# Patient Record
Sex: FEMALE | Race: WHITE
[De-identification: names, ages, dates, MRNs, and addresses within clinical notes are randomized per-mention and may not be internally consistent; named-entity substitution may affect disease eponyms.]

---

## 2019-11-15 ENCOUNTER — TRANSCRIPTION ENCOUNTER (OUTPATIENT)
Age: 64
End: 2019-11-15

## 2019-12-02 ENCOUNTER — TRANSCRIPTION ENCOUNTER (OUTPATIENT)
Age: 64
End: 2019-12-02

## 2019-12-12 ENCOUNTER — TRANSCRIPTION ENCOUNTER (OUTPATIENT)
Age: 64
End: 2019-12-12

## 2022-04-27 PROBLEM — Z00.00 ENCOUNTER FOR PREVENTIVE HEALTH EXAMINATION: Status: ACTIVE | Noted: 2022-04-27

## 2022-05-18 ENCOUNTER — APPOINTMENT (OUTPATIENT)
Dept: ORTHOPEDIC SURGERY | Facility: CLINIC | Age: 67
End: 2022-05-18
Payer: OTHER MISCELLANEOUS

## 2022-05-18 DIAGNOSIS — Z78.9 OTHER SPECIFIED HEALTH STATUS: ICD-10-CM

## 2022-05-18 PROCEDURE — 99072 ADDL SUPL MATRL&STAF TM PHE: CPT

## 2022-05-18 PROCEDURE — 99214 OFFICE O/P EST MOD 30 MIN: CPT

## 2022-05-18 NOTE — HISTORY OF PRESENT ILLNESS
[de-identified] : History of Present Illness\par The patient is a 65 year old R hand dominant female who presents for FUV right knee. Continued diffuse right knee pain\par despite PT and NSAIDS. Swelling comes and goes. Minimal temporary relief from cortisone and gel injection. \par Date of Injury/Onset: 3/9/2009\par Pain: At Rest: 1/10 \par With Activity: 2/10 \par Mechanism of injury: 2009- hit in the knee by a chair and then suffered a fall \par This is a Work Related Injury being treated under Worker's Compensation.\par This is not an athletic injury occurring associated with an interscholastic or organized sports team.\par Quality of symptoms: shooting pain, mild swelling\par Improves with: rest\par Worse with: weight bearing , driving , standing \par Treatment/Imaging/Studies Since Last Visit: PT\par Reports Available For Review Today: none\par Out of work/sport: 9/15/21\par School/Sport/Position/Occupation: Special Education \par Change since last visit: none\par Additional Information: no relief from PT\par History

## 2022-05-18 NOTE — PHYSICAL EXAM
[de-identified] : Constitutional: The general appearance of the patient is well developed, well nourished, no deformities and well groomed. Normal\par \par Gait: Gait and function is as follows: normal gait.\par \par Skin: Head and neck visualized skin is normal. Left upper extremity visualized skin is normal. Right upper extremity visualized skin is normal.\par \par Cardiovascular: good capillary refill in the digits of the bilateral lower extremities and no temperature or color changes in the bilateral lower extremities.\par \par Lymphatic: Normal Palpation of lymph nodes in the inguinal.\par \par Neurologic: heel rub from knee to ankle intact in the bilateral lower extremities. Deep Tendon Reflexes in Upper and Lower Extremities The reflexes are present and symmetrical in the bilateral lower extremities.. The patient is oriented to time, place and person. Sensation to light touch intact in the bilateral lower extremities. Mood and Affect is normal\par \par \par Right Knee: moderate effusion.\par Palpation of the knee is as follows: medial joint line tenderness\par Range of Motion is as follows: Extension: 0 degrees. Flexion: 135 degrees.\par \par 9-20-21: X-Ray Examination of the KNEE 4 or more views Right AP, lateral, skyline and AP both knees standing view patellar mal tracking, mild medial OA\par \par General:\par \par The percentage of impairment is total. Board authorized health care provider. I provided the services listed above.

## 2022-05-18 NOTE — ASSESSMENT
[FreeTextEntry1] : Assessment\par 10-12-21 MRI [facility] Rt Knee\par Shallow radial tear at the free edge of the lateral meniscal body.\par Mild osteoarthritic changes in the medial and patellofemoral compartments.\par Cartilage loss is most pronounced at the medial patellar facet adjacent to a thickened medial parapatellar plica.\par Mild insertional semimembranosus tendinosis and moderate semimembranosus bursitis.

## 2022-06-06 ENCOUNTER — APPOINTMENT (OUTPATIENT)
Dept: PHYSICAL MEDICINE AND REHAB | Facility: CLINIC | Age: 67
End: 2022-06-06
Payer: OTHER MISCELLANEOUS

## 2022-06-06 DIAGNOSIS — Z01.818 ENCOUNTER FOR OTHER PREPROCEDURAL EXAMINATION: ICD-10-CM

## 2022-06-06 DIAGNOSIS — R01.1 CARDIAC MURMUR, UNSPECIFIED: ICD-10-CM

## 2022-06-06 DIAGNOSIS — Z87.891 PERSONAL HISTORY OF NICOTINE DEPENDENCE: ICD-10-CM

## 2022-06-06 PROCEDURE — 36410 VNPNXR 3YR/> PHY/QHP DX/THER: CPT

## 2022-06-06 PROCEDURE — 99244 OFF/OP CNSLTJ NEW/EST MOD 40: CPT | Mod: NP,25

## 2022-06-06 PROCEDURE — 99072 ADDL SUPL MATRL&STAF TM PHE: CPT

## 2022-06-06 PROCEDURE — 93000 ELECTROCARDIOGRAM COMPLETE: CPT

## 2022-06-07 LAB
ANION GAP SERPL CALC-SCNC: 13 MMOL/L
BASOPHILS # BLD AUTO: 0.02 K/UL
BASOPHILS NFR BLD AUTO: 0.4 %
BUN SERPL-MCNC: 17 MG/DL
CALCIUM SERPL-MCNC: 9.5 MG/DL
CHLORIDE SERPL-SCNC: 103 MMOL/L
CO2 SERPL-SCNC: 27 MMOL/L
CREAT SERPL-MCNC: 0.82 MG/DL
EGFR: 80 ML/MIN/1.73M2
EOSINOPHIL # BLD AUTO: 0.06 K/UL
EOSINOPHIL NFR BLD AUTO: 1.1 %
GLUCOSE SERPL-MCNC: 92 MG/DL
HCT VFR BLD CALC: 40.5 %
HGB BLD-MCNC: 12.6 G/DL
IMM GRANULOCYTES NFR BLD AUTO: 0.2 %
LYMPHOCYTES # BLD AUTO: 1.44 K/UL
LYMPHOCYTES NFR BLD AUTO: 25.4 %
MAN DIFF?: NORMAL
MCHC RBC-ENTMCNC: 28 PG
MCHC RBC-ENTMCNC: 31.1 GM/DL
MCV RBC AUTO: 90 FL
MONOCYTES # BLD AUTO: 0.58 K/UL
MONOCYTES NFR BLD AUTO: 10.2 %
NEUTROPHILS # BLD AUTO: 3.55 K/UL
NEUTROPHILS NFR BLD AUTO: 62.7 %
PLATELET # BLD AUTO: 243 K/UL
POTASSIUM SERPL-SCNC: 4 MMOL/L
RBC # BLD: 4.5 M/UL
RBC # FLD: 13.6 %
SODIUM SERPL-SCNC: 142 MMOL/L
WBC # FLD AUTO: 5.66 K/UL

## 2022-06-22 NOTE — HISTORY OF PRESENT ILLNESS
[FreeTextEntry1] : Medical clearance [de-identified] : Patient presents today for medical clearance requested by Sammi Carmichael prior to having right knee surgery.  States she has been doing well aside from her knee.  Denies any CP, SOB or diff breathing.  No recent fever chills, cough or cold type symptoms.  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  Has no other complaints at this time.

## 2022-06-22 NOTE — PHYSICAL EXAM
[No Acute Distress] : no acute distress [Well Nourished] : well nourished [Well Developed] : well developed [Well-Appearing] : well-appearing [Normal Sclera/Conjunctiva] : normal sclera/conjunctiva [PERRL] : pupils equal round and reactive to light [EOMI] : extraocular movements intact [Normal Outer Ear/Nose] : the outer ears and nose were normal in appearance [Normal Oropharynx] : the oropharynx was normal [No JVD] : no jugular venous distention [No Lymphadenopathy] : no lymphadenopathy [Supple] : supple [Thyroid Normal, No Nodules] : the thyroid was normal and there were no nodules present [No Respiratory Distress] : no respiratory distress  [No Accessory Muscle Use] : no accessory muscle use [Clear to Auscultation] : lungs were clear to auscultation bilaterally [Normal Rate] : normal rate  [Regular Rhythm] : with a regular rhythm [Normal S1, S2] : normal S1 and S2 [No Carotid Bruits] : no carotid bruits [No Abdominal Bruit] : a ~M bruit was not heard ~T in the abdomen [No Varicosities] : no varicosities [Pedal Pulses Present] : the pedal pulses are present [No Edema] : there was no peripheral edema [No Palpable Aorta] : no palpable aorta [No Extremity Clubbing/Cyanosis] : no extremity clubbing/cyanosis [Soft] : abdomen soft [Non Tender] : non-tender [Non-distended] : non-distended [No Masses] : no abdominal mass palpated [No HSM] : no HSM [Normal Bowel Sounds] : normal bowel sounds [Normal Posterior Cervical Nodes] : no posterior cervical lymphadenopathy [Normal Anterior Cervical Nodes] : no anterior cervical lymphadenopathy [No CVA Tenderness] : no CVA  tenderness [No Spinal Tenderness] : no spinal tenderness [No Joint Swelling] : no joint swelling [Grossly Normal Strength/Tone] : grossly normal strength/tone [No Rash] : no rash [Coordination Grossly Intact] : coordination grossly intact [No Focal Deficits] : no focal deficits [Normal Gait] : normal gait [Deep Tendon Reflexes (DTR)] : deep tendon reflexes were 2+ and symmetric [Normal Affect] : the affect was normal [Normal Insight/Judgement] : insight and judgment were intact [de-identified] : right knee pain

## 2022-06-22 NOTE — PLAN
[FreeTextEntry1] : EKG - NSR - 72, ELIA - 0.156, No acute T wave changes noted.. \par \par labs reviewed.\par \par Labs Drawn by Dr. Mello Wagoner due to poor venous access.  Patient required lab testing due to conditions in her past medical history requiring periodic monitoring.  Labs were sent to [facility].\par \par \par Patient to stop all vitamins at this time as discussed.\par Increase fluids\par \par Patient is medically optimized at this time and may proceed with proposed procedure.

## 2022-06-23 ENCOUNTER — APPOINTMENT (OUTPATIENT)
Dept: ORTHOPEDIC SURGERY | Facility: AMBULATORY SURGERY CENTER | Age: 67
End: 2022-06-23

## 2022-06-23 PROCEDURE — 29881 ARTHRS KNE SRG MNISECTMY M/L: CPT | Mod: RT

## 2022-06-23 PROCEDURE — 29876 ARTHRS KNEE SURG SYNVCT MAJ: CPT | Mod: 59,RT

## 2022-06-23 PROCEDURE — 29876 ARTHRS KNEE SURG SYNVCT MAJ: CPT | Mod: AS,59,RT

## 2022-06-23 PROCEDURE — 29881 ARTHRS KNE SRG MNISECTMY M/L: CPT | Mod: AS,RT

## 2022-07-06 ENCOUNTER — APPOINTMENT (OUTPATIENT)
Dept: ORTHOPEDIC SURGERY | Facility: CLINIC | Age: 67
End: 2022-07-06

## 2022-07-06 VITALS — BODY MASS INDEX: 20.38 KG/M2 | WEIGHT: 115 LBS | HEIGHT: 63 IN

## 2022-07-06 PROCEDURE — 99024 POSTOP FOLLOW-UP VISIT: CPT

## 2022-07-06 NOTE — DISCUSSION/SUMMARY
[de-identified] : Renewed physical therapy, modify activity\par sutures removed, steri strips applied\par No driving for another 10 days at this time\par F/u in 4 weeks \par ------------------------------------------------------------------------\par KASSIDY HOUSTON acting as a scribe for Dr. Arora\par I, Kassidy Houston, attest that this documentation as been prepared under the direction and in the presence of Provider Chucho Arora MD. \par \par # Home Exercise\par The Patient is instructed on a home exercise program.\par \par # Activity Modification \par The patient was advised to modify their activities. \par \par # DX / Natural History\par The patient was advised of the diagnosis. The natural history of the pathology was explained in full to the patient in layman's terms. Several different treatent options were discussed and explained in full to the patient including the risks and benefits of both surgical and non-surgical treatments. All questions and concerns were answered. \par \par # Pain Guide Activities\par The patient was advised to let pain guide the gradual advancement of activities.\par \par \par

## 2022-07-06 NOTE — HISTORY OF PRESENT ILLNESS
[de-identified] : The patient is s/p _Right knee lateral meniscus tear, synovitis, medical plica, and chondromalacia of trochea and patella\par Date of Surgery: 6/23/22\par Pain: At Rest: 1/10 \par With Activity: 2/10 \par Mechanism of injury: 2009- hit in the knee by a chair and then suffered a fall \par This is a Work Related Injury being treated under Worker's Compensation.\par This is not an athletic injury occurring associated with an interscholastic or organized sports team.\par Quality of symptoms: shooting pain, mild swelling\par Improves with: rest\par Worse with: weight bearing , driving , standing \par Treatment/Imaging/Studies Since Last Visit: SX & PT \par Reports Available For Review Today: none\par Out of work/sport: 9/15/21\par School/Sport/Position/Occupation: Special Education \par Change since last visit: none

## 2022-07-06 NOTE — PHYSICAL EXAM
[de-identified] : Neurologic: normal condition, normal DTR UE/LE, normal sensation, normal mood and affect, oriented and able to communicate.\par Skin: normal skin, no rash, no ulcers and no lesions.\par Lymphatic: no obvious lymphadenopathy in areas examined.\par Constitutional: well developed and well nourished.\par Cardiovascular: peripheral vascular exam is grossly normal.\par Pulmonary: no respiratory distress, lungs clear to auscultation bilaterally. \par Abdomen: normal bowel sounds, non-tender, no HSM and no mass.\par \par RT Knee\par No effusion, clean and dry incisions, intact skin, no fluctuance, no sign of infection, no wound erythema, no induration, no drainage, no purulent damage, no sero-sanguinous drainage, sutures removed, steri strips applied\par

## 2022-08-03 ENCOUNTER — APPOINTMENT (OUTPATIENT)
Dept: ORTHOPEDIC SURGERY | Facility: CLINIC | Age: 67
End: 2022-08-03

## 2022-08-03 VITALS — HEIGHT: 63 IN | WEIGHT: 115 LBS | BODY MASS INDEX: 20.38 KG/M2

## 2022-08-03 PROCEDURE — 99024 POSTOP FOLLOW-UP VISIT: CPT

## 2022-08-03 NOTE — PHYSICAL EXAM
[de-identified] : Neurologic: normal condition, normal DTR UE/LE, normal sensation, normal mood and affect, oriented and able to communicate.\par Skin: normal skin, no rash, no ulcers and no lesions.\par Lymphatic: no obvious lymphadenopathy in areas examined.\par Constitutional: well developed and well nourished.\par Cardiovascular: peripheral vascular exam is grossly normal.\par Pulmonary: no respiratory distress, lungs clear to auscultation bilaterally. \par Abdomen: normal bowel sounds, non-tender, no HSM and no mass.\par \par RT Knee\par Clean and dry incisions, intact skin, no fluctuance, no sign of infection, no wound erythema, no induration, no drainage, no purulent damage, no sero-sanguinous drainage\par Medial tenderness\par Mild swelling \par

## 2022-08-03 NOTE — HISTORY OF PRESENT ILLNESS
[de-identified] : The patient is 6 weeks s/p Right knee lateral meniscus tear, synovitis, medical plica, and chondromalacia of trochlea and patella\par Date of Surgery: 6/23/22\par Pain: At Rest: 1/10 \par With Activity: 2/10 \par Mechanism of injury: 2009- hit in the knee by a chair and then suffered a fall \par This is a Work Related Injury being treated under Worker's Compensation.\par This is not an athletic injury occurring associated with an interscholastic or organized sports team.\par Quality of symptoms: shooting pain, mild swelling\par Improves with: rest\par Worse with: weight bearing , driving , standing \par Treatment/Imaging/Studies Since Last Visit: SX & PT \par Reports Available For Review Today: none\par Out of work/sport: 9/15/21\par School/Sport/Position/Occupation: Special Education \par Change since last visit: none \par

## 2022-08-03 NOTE — DISCUSSION/SUMMARY
[de-identified] : Referred to rheumatologist (Dr. Roth) for Lyme's, rheumatoid, or other autoimmune disorder testing \par Continue physical therapy\par NO work note provided \par Follow up 6 weeks \par \par **Consider ASP & Toradol INJ if not improved. \par -----------------------------------------------\par Home Exercise\par The patient is instructed on a home exercise program.\par \par AYALA GALLEGOS Acting as a Scribe for Dr. Chapa\par I, Ayala Gallegos, attest that this documentation has been prepared under the direction and in the presence of Provider Chucho Chapa MD.\par \par Activity Modification\par The patient was advised to modify their activities.\par \par Dx / Natural History\par The patient was advised of the diagnosis.  The natural history of the pathology was explained in full to the patient in layman's terms.  Several different treatment options were discussed and explained in full to the patient including the risks and benefits of both surgical and non-surgical treatments.  All questions and concerns were answered.\par \par Pain Guide Activities\par The patient was advised to let pain guide the gradual advancement of activities.\par \par RICE\par I explained to the patient that rest, ice, compression, and elevation would benefit them.  They may return to activity after follow-up or when they no longer have any pain.

## 2022-09-14 ENCOUNTER — APPOINTMENT (OUTPATIENT)
Dept: ORTHOPEDIC SURGERY | Facility: CLINIC | Age: 67
End: 2022-09-14

## 2022-09-14 VITALS — WEIGHT: 115 LBS | HEIGHT: 63 IN | BODY MASS INDEX: 20.38 KG/M2

## 2022-09-14 PROCEDURE — 99024 POSTOP FOLLOW-UP VISIT: CPT

## 2022-09-14 PROCEDURE — 20611 DRAIN/INJ JOINT/BURSA W/US: CPT

## 2022-09-14 NOTE — DISCUSSION/SUMMARY
[de-identified] : Patient saw rheumatologist - she stated that she believes the rheumatologist thinks she may have psoriatic arthritis. She was prescribed otesla but has not begun taking it. \par \par RB&A to corticosteroid injection discussed.\par All questions were answered.\par Patient wishes to move forward with injection today. \par \par Aspirated 15ml of clear synovial fluid using ultrasound for proper placement and administered Kenalog/Toradol injection to right knee. \par \par Cortisone AND Toradol Knee Injection - Right\par The risks, benefits, and alternatives to cortisone injection were explained in full to the patient.  Risks outlined include but are not limited to infection, sepsis, bleeding, scarring, skin discoloration, temporary increase in pain, syncopal episode, failure to resolve symptoms, allergic reaction, symptom recurrence, and elevation of blood sugar in diabetics.  Patient understood the risks.  All questions were answered.  After discussion of options, patient requested an injection.  Oral informed consent was obtained and sterile prep was done of the injection site.  A mixture of 40mg of Kenalog, 2cc toradol, 2cc of 1% Lidocaine was sterilely prepared by me.  Sterile technique was used to introduce the mixture into the right knee. Ultrasound was used for proper needle placement.  Patient tolerated the procedure well.  Advised to ice the injection site this evening. \par \par Patient will follow up in 6 weeks \par -----------------------------------------------\par Home Exercise\par The patient is instructed on a home exercise program.\par \par AYALA GALLEGOS Acting as a Scribe for Dr. Chapa\par I, Ayala Gallegos, attest that this documentation has been prepared under the direction and in the presence of Provider Chucho Chapa MD.\par \par Activity Modification\par The patient was advised to modify their activities.\par \par Dx / Natural History\par The patient was advised of the diagnosis.  The natural history of the pathology was explained in full to the patient in layman's terms.  Several different treatment options were discussed and explained in full to the patient including the risks and benefits of both surgical and non-surgical treatments.  All questions and concerns were answered.\par \par Pain Guide Activities\par The patient was advised to let pain guide the gradual advancement of activities.\par \par RICE\par I explained to the patient that rest, ice, compression, and elevation would benefit them.  They may return to activity after follow-up or when they no longer have any pain.

## 2022-09-14 NOTE — PHYSICAL EXAM
[de-identified] : Neurologic: normal condition, normal DTR UE/LE, normal sensation, normal mood and affect, oriented and able to communicate.\par Skin: normal skin, no rash, no ulcers and no lesions.\par Lymphatic: no obvious lymphadenopathy in areas examined.\par Constitutional: well developed and well nourished.\par Cardiovascular: peripheral vascular exam is grossly normal.\par Pulmonary: no respiratory distress, lungs clear to auscultation bilaterally. \par Abdomen: normal bowel sounds, non-tender, no HSM and no mass.\par \par RT Knee\par Clean and dry incisions, intact skin, no fluctuance, no sign of infection, no wound erythema, no induration, no drainage, no purulent damage, no sero-sanguinous drainage\par Medial tenderness\par Mild effusion \par Mild swelling \par

## 2022-09-14 NOTE — HISTORY OF PRESENT ILLNESS
[de-identified] : The patient is 3 months  s/p Right knee lateral meniscus tear, synovitis, medical plica, and chondromalacia of trochlea and patella\par Date of Surgery: 6/23/202\par Pain: At Rest: 1/10 \par With Activity: 2/10 \par Mechanism of injury: 2009- hit in the knee by a chair and then suffered a fall \par This is a Work Related Injury being treated under Worker's Compensation.\par This is not an athletic injury occurring associated with an interscholastic or organized sports team.\par Quality of symptoms: shooting pain, mild swelling\par Improves with: rest\par Worse with: weight bearing , driving , standing \par Treatment/Imaging/Studies Since Last Visit: SX & PT \par Reports Available For Review Today: none\par Out of work/sport: 9/15/21\par School/Sport/Position/Occupation: Special Education \par Change since last visit: none

## 2022-11-02 ENCOUNTER — APPOINTMENT (OUTPATIENT)
Dept: ORTHOPEDIC SURGERY | Facility: CLINIC | Age: 67
End: 2022-11-02

## 2022-11-02 PROCEDURE — 99214 OFFICE O/P EST MOD 30 MIN: CPT

## 2022-11-02 PROCEDURE — 99072 ADDL SUPL MATRL&STAF TM PHE: CPT

## 2022-11-02 NOTE — HISTORY OF PRESENT ILLNESS
[de-identified] : The patient is 5 months s/p Right knee lateral meniscus tear, synovitis, medical plica, and chondromalacia of trochlea and patella\par Date of Surgery: 6/23/202\par Pain: At Rest: 2/10 \par With Activity: 4-5/10 \par Mechanism of injury: 2009- hit in the knee by a chair and then suffered a fall \par This is a Work Related Injury being treated under Worker's Compensation.\par This is not an athletic injury occurring associated with an interscholastic or organized sports team.\par Quality of symptoms: shooting pain, mild swelling\par Improves with: rest\par Worse with: weight bearing , driving , standing \par Treatment/Imaging/Studies Since Last Visit: Physical therapy \par Reports Available For Review Today: none\par Out of work/sport: 9/15/21\par School/Sport/Position/Occupation: Special Education \par Change since last visit: none

## 2022-11-02 NOTE — DISCUSSION/SUMMARY
[de-identified] : Continue physical therapy, Rx renewed\par Patient will follow up in 4 weeks \par -----------------------------------------------\par Home Exercise\par The patient is instructed on a home exercise program.\par \par AYALA GALLEGOS Acting as a Scribe for Dr. Chapa\par I, Ayala Gallegos, attest that this documentation has been prepared under the direction and in the presence of Provider Chucho Chapa MD.\par \par Activity Modification\par The patient was advised to modify their activities.\par \par Dx / Natural History\par The patient was advised of the diagnosis.  The natural history of the pathology was explained in full to the patient in layman's terms.  Several different treatment options were discussed and explained in full to the patient including the risks and benefits of both surgical and non-surgical treatments.  All questions and concerns were answered.\par \par Pain Guide Activities\par The patient was advised to let pain guide the gradual advancement of activities.\par \par RICE\par I explained to the patient that rest, ice, compression, and elevation would benefit them.  They may return to activity after follow-up or when they no longer have any pain.

## 2022-11-02 NOTE — PHYSICAL EXAM
[de-identified] : Neurologic: normal condition, normal DTR UE/LE, normal sensation, normal mood and affect, oriented and able to communicate.\par Skin: normal skin, no rash, no ulcers and no lesions.\par Lymphatic: no obvious lymphadenopathy in areas examined.\par Constitutional: well developed and well nourished.\par Cardiovascular: peripheral vascular exam is grossly normal.\par Pulmonary: no respiratory distress, lungs clear to auscultation bilaterally. \par Abdomen: normal bowel sounds, non-tender, no HSM and no mass.\par \par Right Knee:\par Clean and dry incisions, intact skin, no fluctuance, no sign of infection, no wound erythema, no induration, no drainage, no purulent damage, no sero-sanguinous drainage\par No effusion\par Patellar crepitus\par \par The incident described by the patient was the competent medical cause of the injury. The patient's complaints are consistent with the injury. The patient's history of the injury is consistent with my objective findings. Board authorized health care provider. I provided the services listed above.

## 2022-11-20 ENCOUNTER — FORM ENCOUNTER (OUTPATIENT)
Age: 67
End: 2022-11-20

## 2022-11-29 ENCOUNTER — FORM ENCOUNTER (OUTPATIENT)
Age: 67
End: 2022-11-29

## 2022-12-07 ENCOUNTER — APPOINTMENT (OUTPATIENT)
Dept: ORTHOPEDIC SURGERY | Facility: CLINIC | Age: 67
End: 2022-12-07

## 2022-12-07 PROCEDURE — 99214 OFFICE O/P EST MOD 30 MIN: CPT

## 2022-12-07 PROCEDURE — 99072 ADDL SUPL MATRL&STAF TM PHE: CPT

## 2022-12-07 NOTE — HISTORY OF PRESENT ILLNESS
[de-identified] : The patient is near 6 months s/p Right knee lateral meniscus tear, synovitis, medical plica, and chondromalacia of trochlea and patella. \par Date of Surgery: 6/23/202\par Pain: At Rest: 2/10 \par With Activity: 4-5/10 \par Mechanism of injury: 2009- hit in the knee by a chair and then suffered a fall \par This is a Work Related Injury being treated under Worker's Compensation.\par This is not an athletic injury occurring associated with an interscholastic or organized sports team.\par Quality of symptoms: shooting pain has improved, intermittent swelling, standing prolonged exacerbates\par Improves with: rest\par Worse with: weight bearing , driving , standing \par Treatment/Imaging/Studies Since Last Visit: She continues to do Physical therapy 2-3 x a week with good response\par Reports Available For Review Today: none\par Out of work/sport: 9/15/21 \par School/Sport/Position/Occupation: , Elementary Ed, 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 duration

## 2022-12-07 NOTE — DISCUSSION/SUMMARY
[de-identified] : Continue physical therapy, Rx renewed\par Patient will follow up in 4 weeks \par -----------------------------------------------\par Home Exercise\par The patient is instructed on a home exercise program.\par \par AYALA GALLEGOS Acting as a Scribe for Dr. Chapa\par I, Ayala Gallegos, attest that this documentation has been prepared under the direction and in the presence of Provider Chucho Chapa MD.\par \par Activity Modification\par The patient was advised to modify their activities.\par \par Dx / Natural History\par The patient was advised of the diagnosis.  The natural history of the pathology was explained in full to the patient in layman's terms.  Several different treatment options were discussed and explained in full to the patient including the risks and benefits of both surgical and non-surgical treatments.  All questions and concerns were answered.\par \par Pain Guide Activities\par The patient was advised to let pain guide the gradual advancement of activities.\par \par RICE\par I explained to the patient that rest, ice, compression, and elevation would benefit them.  They may return to activity after follow-up or when they no longer have any pain.

## 2023-01-09 ENCOUNTER — APPOINTMENT (OUTPATIENT)
Dept: ORTHOPEDIC SURGERY | Facility: CLINIC | Age: 68
End: 2023-01-09
Payer: OTHER MISCELLANEOUS

## 2023-01-09 PROCEDURE — 99072 ADDL SUPL MATRL&STAF TM PHE: CPT

## 2023-01-09 PROCEDURE — 99214 OFFICE O/P EST MOD 30 MIN: CPT

## 2023-01-09 NOTE — HISTORY OF PRESENT ILLNESS
[de-identified] : The patient is near 7 months s/p Right knee lateral meniscus tear, synovitis, medical plica, and chondromalacia of trochlea and patella. Unable to do PT over last month due to illness, took a misstep last week which caused some discomfort but otherwise no complaints.\par Date of Surgery: 6/23/202\par Pain: At Rest: 1/10 \par With Activity: 3/10 \par Mechanism of injury: 2009- hit in the knee by a chair and then suffered a fall \par This is a Work Related Injury being treated under Worker's Compensation.\par This is not an athletic injury occurring associated with an interscholastic or organized sports team.\par Quality of symptoms: shooting pain has improved, intermittent swelling, standing prolonged exacerbates\par Improves with: rest\par Worse with: weight bearing , driving , standing \par Treatment/Imaging/Studies Since Last Visit: Unable to do PT over last month due to illness ([facility])\par Reports Available For Review Today: none\par Out of work/sport: 9/15/21 \par School/Sport/Position/Occupation: , Elementary Ed, 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 duration

## 2023-01-09 NOTE — DISCUSSION/SUMMARY
[de-identified] : reinitiate physical therapy, Rx renewed\par Patient will follow up as needed.\par \par ----------------------------------------------------------------------------\par Home Exercise \par \par  The patient is instructed on a home exercise program. \par  \par RICE \par I explained to the patient that rest, ice, compression, and elevation would benefit them.  They may return to activity after follow-up or when they no longer have any pain. \par  \par Pain Guide Activities \par The patient was advised to let pain guide the gradual advancement of activities. \par  \par Activity Modification \par The patient was advised to modify their activities. \par  \par Dx / Natural History \par The patient was advised of the diagnosis.  The natural history of the pathology was explained in full to the patient in layman's terms.  Several different treatment options were discussed and explained in full to the patient including the risks and benefits of both surgical and non-surgical treatments.  All questions and concerns were answered.\par \par "Written by Sarbjit Holden, acting as Scribe for Chucho Chapa M.D"\par

## 2023-01-09 NOTE — PHYSICAL EXAM
[de-identified] : \par Neurologic: normal condition, normal DTR UE/LE, normal sensation, normal mood and affect, oriented and able to communicate.\par Skin: normal skin, no rash, no ulcers and no lesions.\par Lymphatic: no obvious lymphadenopathy in areas examined.\par Constitutional: well developed and well nourished.\par Cardiovascular: peripheral vascular exam is grossly normal.\par Pulmonary: no respiratory distress, lungs clear to auscultation bilaterally. \par Abdomen: normal bowel sounds, non-tender, no HSM and no mass.\par \par Right Knee:\par Healed incisions, intact skin\par No effusion\par medial joint line tenderness\par no calf pain to palpation\par ROM 3-105 AROM, flexion to 120 PROM\par Patellar crepitus\par \par The incident described by the patient was the competent medical cause of the injury. The patient's complaints are consistent with the injury. The patient's history of the injury is consistent with my objective findings. Board authorized health care provider. I provided the services listed above.

## 2023-01-25 ENCOUNTER — FORM ENCOUNTER (OUTPATIENT)
Age: 68
End: 2023-01-25

## 2023-01-26 ENCOUNTER — FORM ENCOUNTER (OUTPATIENT)
Age: 68
End: 2023-01-26

## 2023-02-06 ENCOUNTER — APPOINTMENT (OUTPATIENT)
Dept: ORTHOPEDIC SURGERY | Facility: CLINIC | Age: 68
End: 2023-02-06
Payer: OTHER MISCELLANEOUS

## 2023-02-06 VITALS — BODY MASS INDEX: 20.38 KG/M2 | WEIGHT: 115 LBS | HEIGHT: 63 IN

## 2023-02-06 PROCEDURE — 99214 OFFICE O/P EST MOD 30 MIN: CPT

## 2023-02-06 PROCEDURE — 99072 ADDL SUPL MATRL&STAF TM PHE: CPT

## 2023-02-06 RX ORDER — MELOXICAM 15 MG/1
15 TABLET ORAL DAILY
Qty: 30 | Refills: 2 | Status: ACTIVE | COMMUNITY
Start: 2023-02-06 | End: 1900-01-01

## 2023-02-06 NOTE — PHYSICAL EXAM
[de-identified] : \par Neurologic: normal condition, normal DTR UE/LE, normal sensation, normal mood and affect, oriented and able to communicate.\par Skin: normal skin, no rash, no ulcers and no lesions.\par Lymphatic: no obvious lymphadenopathy in areas examined.\par Constitutional: well developed and well nourished.\par Cardiovascular: peripheral vascular exam is grossly normal.\par Pulmonary: no respiratory distress, lungs clear to auscultation bilaterally. \par Abdomen: normal bowel sounds, non-tender, no HSM and no mass.\par \par Right Knee:\par Healed incisions, intact skin\par No effusion\par medial joint line tenderness\par no calf pain to palpation\par ROM 3-105 AROM, flexion to 120 PROM\par Patellar crepitus\par \par The incident described by the patient was the competent medical cause of the injury. The patient's complaints are consistent with the injury. The patient's history of the injury is consistent with my objective findings. Board authorized health care provider. I provided the services listed above.

## 2023-02-06 NOTE — HISTORY OF PRESENT ILLNESS
[de-identified] : The patient is near 8 months s/p Right knee lateral meniscus tear, synovitis, medical plica, and chondromalacia of trochlea and patella. Patient has been completing PT at [facility]\par Date of Surgery: 6/23/2022\par Pain: At Rest: 2/10 \par With Activity: 4/10 \par Mechanism of injury: 2009- hit in the knee by a chair and then suffered a fall \par This is a Work Related Injury being treated under Worker's Compensation.\par This is not an athletic injury occurring associated with an interscholastic or organized sports team.\par Quality of symptoms: shooting pain has improved, intermittent swelling, standing prolonged exacerbates\par Improves with: rest\par Worse with: weight bearing , driving , standing , physical therapy \par Treatment/Imaging/Studies Since Last Visit: PT\par Reports Available For Review Today: none\par Out of work/sport: 9/15/21 \par School/Sport/Position/Occupation: , [employer], now retired from her position since she was unable to return to work and job duties\par Change since last visit: None

## 2023-02-06 NOTE — DISCUSSION/SUMMARY
[de-identified] :  Physical therapy, Rx renewed\par Rx for anti inflammatory medication. \par Patient will follow up in 1 month. \par ----------------------------------------------------------------------------\par Home Exercise \par \par  The patient is instructed on a home exercise program. \par  \par RICE \par I explained to the patient that rest, ice, compression, and elevation would benefit them.  They may return to activity after follow-up or when they no longer have any pain. \par  \par Pain Guide Activities \par The patient was advised to let pain guide the gradual advancement of activities. \par  \par Activity Modification \par The patient was advised to modify their activities. \par  \par Dx / Natural History \par The patient was advised of the diagnosis.  The natural history of the pathology was explained in full to the patient in layman's terms.  Several different treatment options were discussed and explained in full to the patient including the risks and benefits of both surgical and non-surgical treatments.  All questions and concerns were answered.\par \par "Written by Sarbjit Holden, acting as Scribe for Chucho Chapa M.D"\par

## 2023-03-06 ENCOUNTER — APPOINTMENT (OUTPATIENT)
Dept: ORTHOPEDIC SURGERY | Facility: CLINIC | Age: 68
End: 2023-03-06
Payer: OTHER MISCELLANEOUS

## 2023-03-06 VITALS — WEIGHT: 115 LBS | HEIGHT: 63 IN | BODY MASS INDEX: 20.38 KG/M2

## 2023-03-06 DIAGNOSIS — M94.20 CHONDROMALACIA, UNSPECIFIED SITE: ICD-10-CM

## 2023-03-06 PROCEDURE — 99214 OFFICE O/P EST MOD 30 MIN: CPT

## 2023-03-06 PROCEDURE — 99072 ADDL SUPL MATRL&STAF TM PHE: CPT

## 2023-03-06 NOTE — PHYSICAL EXAM
[de-identified] : \par Neurologic: normal condition, normal DTR UE/LE, normal sensation, normal mood and affect, oriented and able to communicate.\par Skin: normal skin, no rash, no ulcers and no lesions.\par Lymphatic: no obvious lymphadenopathy in areas examined.\par Constitutional: well developed and well nourished.\par Cardiovascular: peripheral vascular exam is grossly normal.\par Pulmonary: no respiratory distress, lungs clear to auscultation bilaterally. \par Abdomen: normal bowel sounds, non-tender, no HSM and no mass.\par \par Right Knee:\par Healed incisions, intact skin\par No effusion\par medial joint line tenderness\par no calf pain to palpation\par ROM 3-125 AROM, flexion to 130 PROM\par Patellar crepitus\par \par The incident described by the patient was the competent medical cause of the injury. The patient's complaints are consistent with the injury. The patient's history of the injury is consistent with my objective findings. Board authorized health care provider. I provided the services listed above.

## 2023-03-06 NOTE — DISCUSSION/SUMMARY
[de-identified] : Continue current physical therapy trial, rx renewed. \par Continue taking anti inflammatory medication. \par Patient will follow up in 1 month. \par ----------------------------------------------------------------------------\par Home Exercise \par The patient is instructed on a home exercise program. \par \par RICE \par I explained to the patient that rest, ice, compression, and elevation would benefit them.  They may return to activity after follow-up or when they no longer have any pain. \par \par Pain Guide Activities \par The patient was advised to let pain guide the gradual advancement of activities. \par \par Activity Modification \par The patient was advised to modify their activities. \par  \par Dx / Natural History \par The patient was advised of the diagnosis.  The natural history of the pathology was explained in full to the patient in layman's terms.  Several different treatment options were discussed and explained in full to the patient including the risks and benefits of both surgical and non-surgical treatments.  All questions and concerns were answered.\par \par I, Deepti Felder, attest that this documentation has been prepared under the direction and in the presence of provider Chucho Chapa M.D. \par

## 2023-03-06 NOTE — HISTORY OF PRESENT ILLNESS
[de-identified] : The patient is a 67 year old female presenting for FUV for the L knee. \par Pain: At Rest: 2/10 \par With Activity: 4/10 \par Mechanism of injury: 2009- hit in the knee by a chair and then suffered a fall \par This is a Work Related Injury being treated under Worker's Compensation. \par This is not an athletic injury occurring associated with an interscholastic or organized sports team.\par Quality of symptoms: shooting pain has improved, intermittent swelling, standing prolonged exacerbates\par Improves with: rest\par Worse with: weight bearing , driving , standing , physical therapy \par Treatment/Imaging/Studies Since Last Visit: PT\par Reports Available For Review Today: none\par Out of work/sport: 9/15/21\par School/Sport/Position/Occupation: , [employer], now retired from her position since she was unable to return to work and job duties\par Change since last visit: None\par Additional information: The patient is near 8 months s/p Right knee lateral meniscus tear, synovitis, medical plica, and chondromalacia of trochlea and patella. Date of Surgery: 6/23/2022

## 2023-03-11 NOTE — PHYSICAL EXAM
142 [de-identified] : Neurologic: normal condition, normal DTR UE/LE, normal sensation, normal mood and affect, oriented and able to communicate.\par Skin: normal skin, no rash, no ulcers and no lesions.\par Lymphatic: no obvious lymphadenopathy in areas examined.\par Constitutional: well developed and well nourished.\par Cardiovascular: peripheral vascular exam is grossly normal.\par Pulmonary: no respiratory distress, lungs clear to auscultation bilaterally. \par Abdomen: normal bowel sounds, non-tender, no HSM and no mass.\par \par Right Knee:\par Healed incisions, intact skin\par No effusion\par medial joint line tenderness\par no calf pain to palpation\par ROM 3-100 AROM, flexion to 120 PROM\par Patellar crepitus\par \par The incident described by the patient was the competent medical cause of the injury. The patient's complaints are consistent with the injury. The patient's history of the injury is consistent with my objective findings. Board authorized health care provider. I provided the services listed above.

## 2023-04-03 ENCOUNTER — APPOINTMENT (OUTPATIENT)
Dept: ORTHOPEDIC SURGERY | Facility: CLINIC | Age: 68
End: 2023-04-03
Payer: OTHER MISCELLANEOUS

## 2023-04-03 VITALS — BODY MASS INDEX: 20.38 KG/M2 | WEIGHT: 115 LBS | HEIGHT: 63 IN

## 2023-04-03 PROCEDURE — 99214 OFFICE O/P EST MOD 30 MIN: CPT

## 2023-04-03 NOTE — HISTORY OF PRESENT ILLNESS
[de-identified] : The patient is a 67 year old female presenting for FUV for the L knee. \par Pain: At Rest: 2/10 \par With Activity: 2/10 \par Mechanism of injury: 2009- hit in the knee by a chair and then suffered a fall \par This is a Work Related Injury being treated under Worker's Compensation. 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 Date of Surgery: 6/23/2022

## 2023-04-03 NOTE — DISCUSSION/SUMMARY
[de-identified] : Continue current physical therapy trial, rx renewed. \par Continue taking anti inflammatory medication. \par Fu in 6 weeks.\par ----------------------------------------------------------------------------\par Home Exercise \par The patient is instructed on a home exercise program. \par \par RICE \par I explained to the patient that rest, ice, compression, and elevation would benefit them.  They may return to activity after follow-up or when they no longer have any pain. \par \par Pain Guide Activities \par The patient was advised to let pain guide the gradual advancement of activities. \par \par Activity Modification \par The patient was advised to modify their activities. \par  \par Dx / Natural History \par The patient was advised of the diagnosis.  The natural history of the pathology was explained in full to the patient in layman's terms.  Several different treatment options were discussed and explained in full to the patient including the risks and benefits of both surgical and non-surgical treatments.  All questions and concerns were answered.\par \par I, Deepti Felder, attest that this documentation has been prepared under the direction and in the presence of provider Chucho Chapa M.D. \par

## 2023-04-03 NOTE — PHYSICAL EXAM
[de-identified] : \par Neurologic: normal condition, normal DTR UE/LE, normal sensation, normal mood and affect, oriented and able to communicate.\par Skin: normal skin, no rash, no ulcers and no lesions.\par Lymphatic: no obvious lymphadenopathy in areas examined.\par Constitutional: well developed and well nourished.\par Cardiovascular: peripheral vascular exam is grossly normal.\par Pulmonary: no respiratory distress, lungs clear to auscultation bilaterally. \par Abdomen: normal bowel sounds, non-tender, no HSM and no mass.\par \par Right Knee:\par Healed incisions, intact skin\par No effusion\par medial joint line tenderness\par no calf pain to palpation\par ROM 3-125 AROM, flexion to 130 PROM\par Patellar crepitus\par \par The incident described by the patient was the competent medical cause of the injury. The patient's complaints are consistent with the injury. The patient's history of the injury is consistent with my objective findings. Board authorized health care provider. I provided the services listed above.

## 2023-05-15 ENCOUNTER — APPOINTMENT (OUTPATIENT)
Dept: ORTHOPEDIC SURGERY | Facility: CLINIC | Age: 68
End: 2023-05-15
Payer: OTHER MISCELLANEOUS

## 2023-05-15 VITALS — BODY MASS INDEX: 20.38 KG/M2 | WEIGHT: 115 LBS | HEIGHT: 63 IN

## 2023-05-15 DIAGNOSIS — S89.92XA UNSPECIFIED INJURY OF LEFT LOWER LEG, INITIAL ENCOUNTER: ICD-10-CM

## 2023-05-15 DIAGNOSIS — M25.562 PAIN IN LEFT KNEE: ICD-10-CM

## 2023-05-15 PROCEDURE — 99214 OFFICE O/P EST MOD 30 MIN: CPT

## 2023-05-15 NOTE — DISCUSSION/SUMMARY
[de-identified] : Packet of exercises provided for home strengthening and/or stretching.\par Mobic as needed\par Patient will follow up in 6 weeks.\par \par \par \par \par \par -----------------------------------------------\par Home Exercise\par The patient is instructed on a home exercise program.\par \par MELINDA PATTERSON Acting as a Scribe for Dr. Chapa\par I, Melinda Patterson, attest that this documentation has been prepared under the direction and in the presence of Provider Chucho Chapa MD.\par \par Activity Modification\par The patient was advised to modify their activities.\par \par Dx / Natural History\par The patient was advised of the diagnosis.  The natural history of the pathology was explained in full to the patient in layman's terms.  Several different treatment options were discussed and explained in full to the patient including the risks and benefits of both surgical and non-surgical treatments.  All questions and concerns were answered.\par \par Pain Guide Activities\par The patient was advised to let pain guide the gradual advancement of activities.\par \par RICE\par I explained to the patient that rest, ice, compression, and elevation would benefit them.  They may return to activity after follow-up or when they no longer have any pain.\par \par The patient's current medication management of their orthopedic diagnosis was reviewed today:\par (1) We discussed a comprehensive treatment plan that included possible pharmaceutical management involving the use of prescription strength medications including but not limited to options such as oral Naprosyn 500mg BID, once daily Meloxicam 15 mg, or 500-650 mg Tylenol versus over the counter oral medications and topical prescription NSAID Pennsaid vs over the counter Voltaren gel.\par (2) There is a moderate risk of morbidity with further treatment, especially from use of prescription strength medications and possible side effects of these medications which include upset stomach with oral medications, skin reactions to topical medications and cardiac/renal issues with long term use.\par (3) I recommended that the patient follow-up with their medical physician to discuss any significant specific potential issues with long term medication use such as interactions with current medications or with exacerbation of underlying medical comorbidities.\par (4) The benefits and risks associated with use of injectable, oral or topical, prescription and over the counter anti-inflammatory medications were discussed with the patient. The patient voiced understanding of the risks including but not limited to bleeding, stroke, kidney dysfunction, heart disease, and were referred to the black box warning label for further information.\par \par

## 2023-05-15 NOTE — PHYSICAL EXAM
[de-identified] : \par Neurologic: normal condition, normal DTR UE/LE, normal sensation, normal mood and affect, oriented and able to communicate.\par \par \par Right Knee:\par Healed incisions, intact skin\par No effusion\par medial joint line tenderness\par no calf pain to palpation\par ROM 3-125 AROM, flexion to 130 PROM\par Patellar crepitus\par Lateral joint line tenderness\par \par \par The incident described by the patient was the competent medical cause of the injury. The patient's complaints are consistent with the injury. The patient's history of the injury is consistent with my objective findings. Board authorized health care provider. I provided the services listed above.  normal (ped)...

## 2023-05-15 NOTE — HISTORY OF PRESENT ILLNESS
[de-identified] : The patient is a 67 year old female presenting for FUV for the L knee. \par Pain: At Rest: 1/10 \par With Activity: 6/10 \par Mechanism of injury: 2009- hit in the knee by a chair and then suffered a fall \par This is a Work Related Injury being treated under Worker's Compensation. 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 Date of Surgery: 6/23/2022

## 2023-06-26 ENCOUNTER — APPOINTMENT (OUTPATIENT)
Dept: ORTHOPEDIC SURGERY | Facility: CLINIC | Age: 68
End: 2023-06-26
Payer: OTHER MISCELLANEOUS

## 2023-06-26 VITALS — WEIGHT: 115 LBS | BODY MASS INDEX: 20.38 KG/M2 | HEIGHT: 63 IN

## 2023-06-26 DIAGNOSIS — M79.18 MYALGIA, OTHER SITE: ICD-10-CM

## 2023-06-26 DIAGNOSIS — S83.271D COMPLEX TEAR OF LATERAL MENISCUS, CURRENT INJURY, RIGHT KNEE, SUBSEQUENT ENCOUNTER: ICD-10-CM

## 2023-06-26 DIAGNOSIS — M65.9 SYNOVITIS AND TENOSYNOVITIS, UNSPECIFIED: ICD-10-CM

## 2023-06-26 DIAGNOSIS — M25.561 PAIN IN RIGHT KNEE: ICD-10-CM

## 2023-06-26 PROCEDURE — 99455 WORK RELATED DISABILITY EXAM: CPT

## 2023-06-26 NOTE — PHYSICAL EXAM
[de-identified] : Neurologic: normal sensation, normal mood and affect, orientated and able to communicate\par \par \par Right Knee:\par Lateral joint line tenderness\par Lateral patellar facet tenderness\par NVI\par full rom\par stable\par no effusion

## 2023-06-26 NOTE — DISCUSSION/SUMMARY
[de-identified] : [employer] percent disability is 10 percent due to trochlea and patella chondromalacia\par \par \par \par \par -----------------------------------------------\par Home Exercise\par The patient is instructed on a home exercise program.\par \par MELINDA PATTERSON Acting as a Scribe for Dr. Chapa\par I, Melinda Patterson, attest that this documentation has been prepared under the direction and in the presence of Provider Chucho Chapa MD.\par \par Activity Modification\par The patient was advised to modify their activities.\par \par Dx / Natural History\par The patient was advised of the diagnosis.  The natural history of the pathology was explained in full to the patient in layman's terms.  Several different treatment options were discussed and explained in full to the patient including the risks and benefits of both surgical and non-surgical treatments.  All questions and concerns were answered.\par \par Pain Guide Activities\par The patient was advised to let pain guide the gradual advancement of activities.\par \par RICE\par I explained to the patient that rest, ice, compression, and elevation would benefit them.  They may return to activity after follow-up or when they no longer have any pain.\par \par The patient's current medication management of their orthopedic diagnosis was reviewed today:\par (1) We discussed a comprehensive treatment plan that included possible pharmaceutical management involving the use of prescription strength medications including but not limited to options such as oral Naprosyn 500mg BID, once daily Meloxicam 15 mg, or 500-650 mg Tylenol versus over the counter oral medications and topical prescription NSAID Pennsaid vs over the counter Voltaren gel.\par (2) There is a moderate risk of morbidity with further treatment, especially from use of prescription strength medications and possible side effects of these medications which include upset stomach with oral medications, skin reactions to topical medications and cardiac/renal issues with long term use.\par (3) I recommended that the patient follow-up with their medical physician to discuss any significant specific potential issues with long term medication use such as interactions with current medications or with exacerbation of underlying medical comorbidities.\par (4) The benefits and risks associated with use of injectable, oral or topical, prescription and over the counter anti-inflammatory medications were discussed with the patient. The patient voiced understanding of the risks including but not limited to bleeding, stroke, kidney dysfunction, heart disease, and were referred to the black box warning label for further information.\par \par

## 2025-02-12 ENCOUNTER — NON-APPOINTMENT (OUTPATIENT)
Age: 70
End: 2025-02-12

## 2025-05-17 ENCOUNTER — NON-APPOINTMENT (OUTPATIENT)
Age: 70
End: 2025-05-17